# Patient Record
Sex: MALE | NOT HISPANIC OR LATINO | Employment: FULL TIME | URBAN - METROPOLITAN AREA
[De-identification: names, ages, dates, MRNs, and addresses within clinical notes are randomized per-mention and may not be internally consistent; named-entity substitution may affect disease eponyms.]

---

## 2017-04-12 ENCOUNTER — TELEPHONE (OUTPATIENT)
Dept: FAMILY MEDICINE | Facility: CLINIC | Age: 32
End: 2017-04-12

## 2017-04-12 ENCOUNTER — RADIANT APPOINTMENT (OUTPATIENT)
Dept: GENERAL RADIOLOGY | Facility: CLINIC | Age: 32
End: 2017-04-12
Attending: FAMILY MEDICINE
Payer: COMMERCIAL

## 2017-04-12 ENCOUNTER — OFFICE VISIT (OUTPATIENT)
Dept: FAMILY MEDICINE | Facility: CLINIC | Age: 32
End: 2017-04-12
Payer: COMMERCIAL

## 2017-04-12 VITALS
SYSTOLIC BLOOD PRESSURE: 113 MMHG | BODY MASS INDEX: 26.45 KG/M2 | OXYGEN SATURATION: 97 % | WEIGHT: 164.6 LBS | DIASTOLIC BLOOD PRESSURE: 72 MMHG | HEIGHT: 66 IN | HEART RATE: 86 BPM | RESPIRATION RATE: 12 BRPM | TEMPERATURE: 98.2 F

## 2017-04-12 DIAGNOSIS — M54.50 ACUTE BILATERAL LOW BACK PAIN WITHOUT SCIATICA: Primary | ICD-10-CM

## 2017-04-12 PROCEDURE — 72100 X-RAY EXAM L-S SPINE 2/3 VWS: CPT

## 2017-04-12 PROCEDURE — 99202 OFFICE O/P NEW SF 15 MIN: CPT | Performed by: FAMILY MEDICINE

## 2017-04-12 NOTE — MR AVS SNAPSHOT
"              After Visit Summary   2017    Ruslan Patino    MRN: [id number]           Patient Information     Date Of Birth          1985        Visit Information        Provider Department      2017 1:45 PM Isidro Hernadez MD [facility]        Today's Diagnoses     Acute bilateral low back pain without sciatica    -  1       Follow-ups after your visit        Who to contact     If you have questions or need follow up information about today's clinic visit or your schedule please contact [facility] directly at [phone].  Normal or non-critical lab and imaging results will be communicated to you by allGreenuphart, letter or phone within 4 business days after the clinic has received the results. If you do not hear from us within 7 days, please contact the clinic through allGreenuphart or phone. If you have a critical or abnormal lab result, we will notify you by phone as soon as possible.  Submit refill requests through [employer] or call your pharmacy and they will forward the refill request to us. Please allow 3 business days for your refill to be completed.          Additional Information About Your Visit        MyChart Information     [employer] lets you send messages to your doctor, view your test results, renew your prescriptions, schedule appointments and more. To sign up, go to [email or web address] . Click on \"Log in\" on the left side of the screen, which will take you to the Welcome page. Then click on \"Sign up Now\" on the right side of the page.     You will be asked to enter the access code listed below, as well as some personal information. Please follow the directions to create your username and password.     Your access code is: [email or web address]  Expires: 2017  2:54 PM     Your access code will  in 90 days. If you need help or a new code, please call your [facility] or [phone].        Care EveryWhere ID     This is your Care " "EveryWhere ID. This could be used by other organizations to access your [facility] medical records  [id number]        Your Vitals Were     Pulse Temperature Respirations Height Pulse Oximetry BMI (Body Mass Index)    86 98.2  F (36.8  C) (Oral) 12 5' 6\" (1.676 m) 97% 26.57 kg/m2       Blood Pressure from Last 3 Encounters:   04/12/17 113/72   05/25/14 111/71    Weight from Last 3 Encounters:   04/12/17 164 lb 9.6 oz (74.7 kg)              We Performed the Following     XR Lumbar Spine 2/3 Views          Today's Medication Changes          These changes are accurate as of: 4/12/17  2:55 PM.  If you have any questions, ask your nurse or doctor.               Start taking these medicines.        Dose/Directions    nabumetone 750 MG tablet   Commonly known as:  RELAFEN   Used for:  Acute bilateral low back pain without sciatica   Started by:  Isidro Hernadez MD        Dose:  750 mg   Take 1 tablet (750 mg) by mouth 2 times daily as needed for moderate pain TAKE WITH FOOD   Quantity:  30 tablet   Refills:  1            Where to get your medicines      These medications were sent to [facility] - [place], [place] - [address]  [address] , [facility] [address]     Phone:  [phone]     nabumetone 750 MG tablet                Primary Care Provider Office Phone # Fax #    Isidro Hernadez [phone]       [facility] [id number] [place] [address]        Thank you!     Thank you for choosing [facility]  for your care. Our goal is always to provide you with excellent care. Hearing back from our patients is one way we can continue to improve our services. Please take a few minutes to complete the written survey that you may receive in the mail after your visit with us. Thank you!             Your Updated Medication List - Protect others around you: Learn how to safely use, store " and throw away your medicines at www.disposemymeds.org.          This list is accurate as of: 4/12/17  2:55 PM.  Always use your most recent med list.                   Brand Name Dispense Instructions for use    albuterol 108 (90 BASE) MCG/ACT Inhaler    PROAIR HFA/PROVENTIL HFA/VENTOLIN HFA     Inhale 2 puffs into the lungs every 6 hours       alum & mag hydroxide-simethicone 200-200-20 MG/5ML Susp suspension    MYLANTA    1 Bottle    Take 30 mLs by mouth every 4 hours as needed for indigestion       nabumetone 750 MG tablet    RELAFEN    30 tablet    Take 1 tablet (750 mg) by mouth 2 times daily as needed for moderate pain TAKE WITH FOOD       pantoprazole 40 MG EC tablet    PROTONIX    30 tablet    Take 1 tablet (40 mg) by mouth daily

## 2017-04-12 NOTE — TELEPHONE ENCOUNTER
This is listed as a less than 1 percent reported finding, just like aspirin or aleve. By taking it after work only he could reduce the odds to even less.   Alternatively he could just take tylenol.

## 2017-04-12 NOTE — NURSING NOTE
"Chief Complaint   Patient presents with     Abdominal Pain     Back Pain       Initial /72 (BP Location: Left arm, Patient Position: Chair, Cuff Size: Adult Large)  Pulse 86  Temp 98.2  F (36.8  C) (Oral)  Resp 12  Ht 5' 6\" (1.676 m)  Wt 164 lb 9.6 oz (74.7 kg)  SpO2 97%  BMI 26.57 kg/m2 Estimated body mass index is 26.57 kg/(m^2) as calculated from the following:    Height as of this encounter: 5' 6\" (1.676 m).    Weight as of this encounter: 164 lb 9.6 oz (74.7 kg).  Medication Reconciliation: complete   Carlo Arzate CMA       "

## 2017-04-12 NOTE — TELEPHONE ENCOUNTER
Patient calling and states he was in and given Relafen and it states to avoid sun.  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  [facility] pharmacist did not feel best med for him due to his job.  Advised I would send you message to see if you were OK with him taking this or wanted to change med.  Please advise.  Call him back at [phone].  Maria Isabel San RN

## 2017-04-12 NOTE — PROGRESS NOTES
"  SUBJECTIVE:                                                    Ruslan Patino is a 31 year old male who presents to clinic today for the following health issues:      Back Pain      Duration: 1 week        Specific cause: -no specific trauma, he does hard physical work all day and seemed to get worse as the week went on  Back Subjective:         Symptoms began:   7 day(s) ago       Symptoms changing:  onset gradual and are worse.                  Location:  low back bilateral and middle of back bilateral region       Radiation to does not radiate       At worst a 7-9 on a scale of 1-10.         Personal hx of back pain is:  On and off for a couple of years \"from my work\" no specific incident or trauma.       Pain is exacerbated by: lifting, bending and pulling.       Pain is relieved by: ice and rest.       Associated sx include: none.       Previous plain films obtained: No.        Results: today normal:he's had DC care in the past and we discussed PT techniques and DC options.       Red flag symptoms: negative.  (M54.5) Acute bilateral low back pain without sciatica  (primary encounter diagnosis)  Comment: normal neurological, no pain on straight leg raise.  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

## 2017-04-16 PROBLEM — M54.50 ACUTE BILATERAL LOW BACK PAIN WITHOUT SCIATICA: Status: ACTIVE | Noted: 2017-04-16

## 2018-07-17 ENCOUNTER — NURSE TRIAGE (OUTPATIENT)
Dept: NURSING | Facility: CLINIC | Age: 33
End: 2018-07-17

## 2021-05-09 ENCOUNTER — HEALTH MAINTENANCE LETTER (OUTPATIENT)
Age: 36
End: 2021-05-09

## 2021-10-24 ENCOUNTER — HEALTH MAINTENANCE LETTER (OUTPATIENT)
Age: 36
End: 2021-10-24

## 2022-02-23 ENCOUNTER — APPOINTMENT (OUTPATIENT)
Dept: GENERAL RADIOLOGY | Facility: CLINIC | Age: 37
End: 2022-02-23
Attending: EMERGENCY MEDICINE
Payer: COMMERCIAL

## 2022-02-23 ENCOUNTER — HOSPITAL ENCOUNTER (EMERGENCY)
Facility: CLINIC | Age: 37
Discharge: HOME | End: 2022-02-23
Attending: EMERGENCY MEDICINE | Admitting: EMERGENCY MEDICINE
Payer: COMMERCIAL

## 2022-02-23 VITALS
RESPIRATION RATE: 20 BRPM | TEMPERATURE: 98 F | HEART RATE: 81 BPM | OXYGEN SATURATION: 97 % | DIASTOLIC BLOOD PRESSURE: 88 MMHG | SYSTOLIC BLOOD PRESSURE: 141 MMHG

## 2022-02-23 DIAGNOSIS — S20.212A CONTUSION OF RIB ON LEFT SIDE, INITIAL ENCOUNTER: ICD-10-CM

## 2022-02-23 LAB
ATRIAL RATE - MUSE: 69 BPM
DIASTOLIC BLOOD PRESSURE - MUSE: NORMAL MMHG
INTERPRETATION ECG - MUSE: NORMAL
P AXIS - MUSE: 63 DEGREES
PR INTERVAL - MUSE: 144 MS
QRS DURATION - MUSE: 92 MS
QT - MUSE: 370 MS
QTC - MUSE: 396 MS
R AXIS - MUSE: 59 DEGREES
SYSTOLIC BLOOD PRESSURE - MUSE: NORMAL MMHG
T AXIS - MUSE: 56 DEGREES
VENTRICULAR RATE- MUSE: 69 BPM

## 2022-02-23 PROCEDURE — 71101 X-RAY EXAM UNILAT RIBS/CHEST: CPT | Mod: LT

## 2022-02-23 PROCEDURE — 99285 EMERGENCY DEPT VISIT HI MDM: CPT | Mod: 25

## 2022-02-23 PROCEDURE — 93005 ELECTROCARDIOGRAM TRACING: CPT

## 2022-02-23 PROCEDURE — 71046 X-RAY EXAM CHEST 2 VIEWS: CPT

## 2022-02-23 ASSESSMENT — ENCOUNTER SYMPTOMS
SHORTNESS OF BREATH: 1
COUGH: 0
FEVER: 0
ARTHRALGIAS: 1

## 2022-02-23 NOTE — ED TRIAGE NOTES
"Pt reports trauma to left side of ribs. Pt feels pinching in nature with deep breaths. Pt unwilling to describe the trauma that took place to cause pain. Pt states \"it was repeated trauma\" in nature.  "
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
## 2022-06-05 ENCOUNTER — HEALTH MAINTENANCE LETTER (OUTPATIENT)
Age: 37
End: 2022-06-05

## 2022-09-22 ENCOUNTER — HOSPITAL ENCOUNTER (EMERGENCY)
Facility: CLINIC | Age: 37
Discharge: HOME | End: 2022-09-22
Admitting: EMERGENCY MEDICINE
Payer: COMMERCIAL

## 2022-09-22 VITALS
TEMPERATURE: 97.5 F | DIASTOLIC BLOOD PRESSURE: 91 MMHG | BODY MASS INDEX: 22.44 KG/M2 | OXYGEN SATURATION: 98 % | WEIGHT: 139 LBS | HEART RATE: 78 BPM | SYSTOLIC BLOOD PRESSURE: 128 MMHG | RESPIRATION RATE: 18 BRPM

## 2022-09-22 PROCEDURE — 93005 ELECTROCARDIOGRAM TRACING: CPT | Mod: RTG

## 2022-09-22 PROCEDURE — 999N000104 HC STATISTIC NO CHARGE

## 2022-09-22 NOTE — ED TRIAGE NOTES
Presents to ED from urgent care. Patient was seen in  today for chest pain, dizziness, and tingling in feet that began about 1300 today. Patient was referred to ED for evaluation d/t symptoms.      Triage Assessment     Row Name 09/22/22 3070       Triage Assessment (Adult)    Airway WDL WDL       Respiratory WDL    Respiratory WDL WDL       Cardiac WDL    Cardiac WDL X;chest pain       Chest Pain Assessment    Chest Pain Location anterior chest, left

## 2022-09-23 LAB
ATRIAL RATE - MUSE: 67 BPM
DIASTOLIC BLOOD PRESSURE - MUSE: NORMAL MMHG
INTERPRETATION ECG - MUSE: NORMAL
P AXIS - MUSE: 66 DEGREES
PR INTERVAL - MUSE: 150 MS
QRS DURATION - MUSE: 92 MS
QT - MUSE: 404 MS
QTC - MUSE: 426 MS
R AXIS - MUSE: 63 DEGREES
SYSTOLIC BLOOD PRESSURE - MUSE: NORMAL MMHG
T AXIS - MUSE: 58 DEGREES
VENTRICULAR RATE- MUSE: 67 BPM

## 2022-10-15 ENCOUNTER — HEALTH MAINTENANCE LETTER (OUTPATIENT)
Age: 37
End: 2022-10-15

## 2022-12-10 ENCOUNTER — HOSPITAL ENCOUNTER (EMERGENCY)
Facility: CLINIC | Age: 37
Discharge: HOME | End: 2022-12-10
Attending: EMERGENCY MEDICINE | Admitting: EMERGENCY MEDICINE
Payer: COMMERCIAL

## 2022-12-10 VITALS
RESPIRATION RATE: 16 BRPM | SYSTOLIC BLOOD PRESSURE: 117 MMHG | DIASTOLIC BLOOD PRESSURE: 94 MMHG | TEMPERATURE: 98.5 F | HEART RATE: 78 BPM | OXYGEN SATURATION: 98 %

## 2022-12-10 DIAGNOSIS — G47.00 INSOMNIA, UNSPECIFIED TYPE: ICD-10-CM

## 2022-12-10 PROCEDURE — 99283 EMERGENCY DEPT VISIT LOW MDM: CPT

## 2022-12-10 RX ORDER — ESZOPICLONE 2 MG/1
2 TABLET, FILM COATED ORAL
Qty: 5 TABLET | Refills: 0 | Status: SHIPPED | OUTPATIENT
Start: 2022-12-10 | End: 2022-12-15

## 2022-12-10 ASSESSMENT — ENCOUNTER SYMPTOMS
CHILLS: 0
RHINORRHEA: 1
COUGH: 0
FEVER: 0
SLEEP DISTURBANCE: 1
HALLUCINATIONS: 0

## 2022-12-10 NOTE — ED TRIAGE NOTES
Unable to sleep and tremors for several days.  Pt states he is taking trazodone and still cant sleep.  Pt used to take Lunesta for sleep but ran out recently.

## 2022-12-10 NOTE — ED PROVIDER NOTES
History   Chief Complaint:  Insomnia    The history is provided by the patient.      Ruslan Patino is a 37 year old male with history of anxiety and panic attacks who presents with insomnia. Patient reports that he has been unable to sleep for 5 days since he has been out of his Lunesta. He states that he was using it as prescribed and has a refill, but he is unable to fill it at this time. He notes that he has tried using Melatonin, as well as Trazodone, both do not help his insomnia. He denies fever, chills and cough. He adds that he has mild congestion and rhinorrhea, but tested negative for strep throat, flu and Covid a few days ago. No other physical symptoms. He denies hallucinations, suicidal or homicidal ideations. He denies alcohol and drug use. He denies increased life stressors. In addition, his Lunesta was prescribed as 2 mg every day use.     Review of Systems   Constitutional: Negative for chills and fever.   HENT: Positive for congestion and rhinorrhea.    Respiratory: Negative for cough.    Psychiatric/Behavioral: Positive for sleep disturbance. Negative for hallucinations and suicidal ideas.   All other systems reviewed and are negative.    Allergies:  Oxycodone    Medications:  Omeprazole  Albuterol inhaler  Montelukast   Tramadol     Past Medical History:     Acute bilateral low back pain without sciatica  Asthma  Anxiety  Panic attack     Social History:  Presents alone  Presents via private vehicle  PCP: Clinic, [facility]     Physical Exam     Patient Vitals for the past 24 hrs:   BP Temp Temp src Pulse Resp SpO2   12/10/22 0445 (!) 117/94 98.5  F (36.9  C) Oral 78 16 --   12/10/22 0444 -- -- -- -- -- 98 %       Physical Exam  General: Resting comfortably   Head:  The scalp, face, and head appear normal  Eyes:  The pupils are normal    Conjunctivae and sclera appear normal  ENT:    The nose is normal  Neck:  Normal range of motion  MSK:  Moves all extremities  equally  Skin:  No rash or lesions noted.  Neuro: Speech is normal and fluent  Psych:  Awake. Alert.  Normal affect.  Denies suicidal ideations or hallucinations      Emergency Department Course   Emergency Department Course:     Reviewed:  I reviewed nursing notes, vitals, past medical history and Care Everywhere    Assessments:  0425 I obtained history and examined the patient as noted above. Amenable to discharge.    Disposition:  The patient was discharged to home.     Impression & Plan   Medical Decision Making:  Patient is a 37-year-old male with history of anxiety and panic attacks presenting with insomnia.  He reports recently being out of his Lunesta.  I will provide patient with a few days of this until he can follow-up with his PCP.  He also reports rhinorrhea/congestion, I did offer COVID-19/influenza/RSV testing though he is declining today.  He is overall in no significant distress and I doubt serious bacterial process at this point in time.  He denies any suicidality or response to internal stimuli.  I did recommend he contact his PCP on Monday to facilitate close outpatient follow-up and he is agreeable to this.  Return precautions given.    Diagnosis:    ICD-10-CM    1. Insomnia, unspecified type  G47.00           Discharge Medications:  New Prescriptions    ESZOPICLONE (LUNESTA) 2 MG TABLET    Take 1 tablet (2 mg) by mouth nightly as needed for sleep       Scribe Disclosure:  Nazia MAHONEY, am serving as a scribe at 3:57 AM on 12/10/2022 to document services personally performed by Radha Pires DO based on my observations and the provider's statements to me.            Radha Pires DO  12/10/22 7168

## 2023-04-18 ENCOUNTER — HOSPITAL ENCOUNTER (EMERGENCY)
Facility: CLINIC | Age: 38
Discharge: LEFT BEFORE DISCHARGE | End: 2023-04-18
Payer: COMMERCIAL

## 2023-04-18 VITALS
OXYGEN SATURATION: 98 % | DIASTOLIC BLOOD PRESSURE: 98 MMHG | TEMPERATURE: 97.6 F | RESPIRATION RATE: 16 BRPM | BODY MASS INDEX: 25.58 KG/M2 | HEIGHT: 67 IN | HEART RATE: 106 BPM | WEIGHT: 163 LBS | SYSTOLIC BLOOD PRESSURE: 138 MMHG

## 2023-04-18 NOTE — ED TRIAGE NOTES
37M presents to ED c/o CP and SOB x 1 hour.  Pt states positional pain in middle chest that worsens with deep breathing and laying supine.  Pt endorsed dizziness/lightheadedness associated.  VS noted for tachycardia.  A&Ox4     Triage Assessment     Row Name 04/18/23 0145       Triage Assessment (Adult)    Airway WDL WDL       Respiratory WDL    Respiratory WDL WDL       Skin Circulation/Temperature WDL    Skin Circulation/Temperature WDL WDL       Cardiac WDL    Cardiac WDL WDL       Peripheral/Neurovascular WDL    Peripheral Neurovascular WDL WDL       Cognitive/Neuro/Behavioral WDL    Cognitive/Neuro/Behavioral WDL WDL

## 2023-05-26 ENCOUNTER — NURSE TRIAGE (OUTPATIENT)
Dept: NURSING | Facility: CLINIC | Age: 38
End: 2023-05-26
Payer: COMMERCIAL

## 2023-05-27 NOTE — TELEPHONE ENCOUNTER
"Patient calling. He ate a Delta-9 gummy today, and took some tramadol. Today he experienced tachycardia, with a heart rate of 140. HR is currently 130.    States that he can feel his heart racing. States that he is having \"choppy\" breathing.     Patient endorses dizziness and light-headedness when asked. Care advice given for patient to call 911.    Patient agreed to call 911 after it was explained to him that paramedics would be able to treat any further symptoms while he was in transit, while someone else driving him to the hospital would not.     Yessi Askew RN  [facility]  May 26, 2023, 8:01 PM    Reason for Disposition    [1] Dizziness, lightheadedness, or weakness AND [2] heart beating very rapidly (e.g., > 140 / minute)    Additional Information    Negative: [1] Received SHOCK from implantable cardiac defibrillator AND [2] persisting symptoms (i.e., palpitations, lightheadedness)    Negative: Unable to walk, or can only walk with assistance (e.g., requires support)    Negative: Visible sweat on face or sweat dripping down face    Negative: Difficult to awaken or acting confused (e.g., disoriented, slurred speech)    Negative: Shock suspected (e.g., cold/pale/clammy skin, too weak to stand, low BP, rapid pulse)    Negative: Passed out (i.e., lost consciousness, collapsed and was not responding)    Protocols used: HEART RATE AND HEARTBEAT [id number]      "

## 2023-06-11 ENCOUNTER — HEALTH MAINTENANCE LETTER (OUTPATIENT)
Age: 38
End: 2023-06-11

## 2024-04-14 ENCOUNTER — HOSPITAL ENCOUNTER (EMERGENCY)
Facility: CLINIC | Age: 39
Discharge: HOME | End: 2024-04-14
Attending: PHYSICIAN ASSISTANT | Admitting: PHYSICIAN ASSISTANT
Payer: COMMERCIAL

## 2024-04-14 VITALS
DIASTOLIC BLOOD PRESSURE: 104 MMHG | RESPIRATION RATE: 18 BRPM | SYSTOLIC BLOOD PRESSURE: 128 MMHG | OXYGEN SATURATION: 96 % | TEMPERATURE: 98 F | HEART RATE: 97 BPM

## 2024-04-14 DIAGNOSIS — S61.210A LACERATION OF RIGHT INDEX FINGER WITHOUT FOREIGN BODY WITHOUT DAMAGE TO NAIL, INITIAL ENCOUNTER: ICD-10-CM

## 2024-04-14 PROCEDURE — 99283 EMERGENCY DEPT VISIT LOW MDM: CPT

## 2024-04-14 PROCEDURE — 12001 RPR S/N/AX/GEN/TRNK 2.5CM/<: CPT

## 2024-04-14 ASSESSMENT — ACTIVITIES OF DAILY LIVING (ADL): ADLS_ACUITY_SCORE: 33

## 2024-04-14 ASSESSMENT — COLUMBIA-SUICIDE SEVERITY RATING SCALE - C-SSRS
2. HAVE YOU ACTUALLY HAD ANY THOUGHTS OF KILLING YOURSELF IN THE PAST MONTH?: NO
6. HAVE YOU EVER DONE ANYTHING, STARTED TO DO ANYTHING, OR PREPARED TO DO ANYTHING TO END YOUR LIFE?: NO

## 2024-04-15 NOTE — ED PROVIDER NOTES
History     Chief Complaint:  Laceration       The history is provided by the patient.      Ruslan Patino is a 38 year old male who presents with a laceration to his right pointer finger after cutting some boxes approximately 1.5 hours ago. He reports he wrapped his finger in gauze and later removed it but his bleeding continued, prompting him to the ED. Denies any numbness to his finger. He can still move it normally.    Independent Historian:   None - Patient Only    Review of External Notes:   None     Medications:    Protonix  Relafen    Past Medical History:    Asthma    Physical Exam   Patient Vitals for the past 24 hrs:   BP Temp Pulse Resp SpO2   04/14/24 2102 (!) 128/104 98  F (36.7  C) 97 18 96 %      Physical Exam  General: Alert and oriented.    Head: Normocephalic.  External ears and nose normal.    Eyes: Pupils equal and round.  Normal tracking.    Pulmonary/Chest: Effort and rate normal    MSK:  No bony ttp, ranging DIP, PIP, MCP joints normally.    SKIN:  1.5 cm laceration to volar R 2nd finger middle phalynx.  No FB or tendon disruption on exam under bloodless field.  Does not cross IP joints.  Warm and dry with strong radial pulse and cap refill <2 seconds    NEURO:  Normal strength of flexion and extension at MCP, PIP, and DIP joints.  Normal sensation to touch BL in finger.    PSYCH:  Normal affect      Emergency Department Course     Procedures    Digital Block       Procedure: Digital Block    Indication: Wound care    Consent: Verbal    Preparation: Alcohol    Anesthesia: A digital block was performed with Bupivacaine - 0.5% on the affected digit.     Location: R second (index) finger    Procedure Detail: A digital block was performed on the indicated digit with the above anesthetic.     Patient status: The patient tolerated the procedure well: Yes. There were no complications.    Laceration Repair      Procedure: Laceration Repair    Indication: Laceration    Consent:  Verbal    Location: Right R second (index) finger    Length: 1.5 cm    Preparation: Irrigation with Sterile Saline.    Anesthesia/Sedation: Bupivacaine - 0.5%      Treatment/Exploration: Wound explored, no foreign bodies found     Closure: The wound was closed with one layer. Skin/superficial layer was closed with 3 x 4-0 Nylon using Interrupted sutures.     Patient Status: The patient tolerated the procedure well: Yes. There were no complications.      Emergency Department Course & Assessments:    Interventions:  Medications - No data to display     Assessments:  2140 I obtained history and examined the patient as noted above.  2156  I performed a laceration repair on the patient. We discussed plan for discharge and patient is in agreement with plan.     Independent Interpretation (X-rays, CTs, rhythm strip):  None    Consultations/Discussion of Management or Tests:  None        Social Determinants of Health affecting care:   Employment/Unemployment  Works construction which complicates immobilization.  Disposition:  The patient was discharged.     Impression & Plan    CMS Diagnoses: None       Medical Decision Making:  Ruslan Patino is a 38 year old male who presents with laceration to finger.  Patient history and records reviewed.  On examination, the patient has a laceration, but is otherwise well appearing.  There is no evidence of neurovascular compromise, fracture, imbedded foreign body, or tendon injury.  Wound was anesthetized, irrigated, explored, and closed as above with non-absorbale sutures.  Tetanus checked and UTD.  Discussed indications to return to ED if new or worsening symptoms, or signs of infection such as fever, swelling, spreading erythema, drainage, or increasing pain.  Advised sun protection to reduce scarring.  Follow-up with primary care provider in 12 days for suture removal.  Removable AlumaFoam splint given to help prevent wound dehiscence.        Diagnosis:    ICD-10-CM    1.  Laceration of right index finger without foreign body without damage to nail, initial encounter  S61.210A                Scribe Disclosure:  I, Haritha Lopez, am serving as a scribe at 9:39 PM on 4/14/2024 to document services personally performed by [place] PA-C* based on my observations and the provider's statements to me.   4/14/2024   [place] Rondon PA-C Etten, Clark Ellsworth, PA-C  04/14/24 4563

## 2024-04-15 NOTE — ED TRIAGE NOTES
Pt presents to triage with c/o finger laceration to R pointer finger. Pt was cutting cardboard with knife about 40 minutes PTA. Pt held pressure then tried to bandage but bleeding continued. Bleeding is currently controled in triage. Tdap up to date, 2/21.

## 2024-08-04 ENCOUNTER — HEALTH MAINTENANCE LETTER (OUTPATIENT)
Age: 39
End: 2024-08-04

## 2025-08-16 ENCOUNTER — HEALTH MAINTENANCE LETTER (OUTPATIENT)
Age: 40
End: 2025-08-16

## (undated) RX ORDER — BUPIVACAINE HYDROCHLORIDE 5 MG/ML
INJECTION, SOLUTION EPIDURAL; INTRACAUDAL
Status: DISPENSED
Start: 2024-04-14